# Patient Record
Sex: FEMALE | Race: WHITE | NOT HISPANIC OR LATINO | Employment: OTHER | URBAN - METROPOLITAN AREA
[De-identification: names, ages, dates, MRNs, and addresses within clinical notes are randomized per-mention and may not be internally consistent; named-entity substitution may affect disease eponyms.]

---

## 2020-11-09 ENCOUNTER — OFFICE VISIT (OUTPATIENT)
Dept: URGENT CARE | Facility: CLINIC | Age: 75
End: 2020-11-09
Payer: COMMERCIAL

## 2023-06-22 ENCOUNTER — OFFICE VISIT (OUTPATIENT)
Dept: PODIATRY | Facility: CLINIC | Age: 78
End: 2023-06-22
Payer: MEDICARE

## 2023-06-22 VITALS — WEIGHT: 120 LBS | HEIGHT: 67 IN | BODY MASS INDEX: 18.83 KG/M2

## 2023-06-22 DIAGNOSIS — M76.70 PERONEAL TENDONITIS, UNSPECIFIED LATERALITY: ICD-10-CM

## 2023-06-22 DIAGNOSIS — M19.079 OSTEOARTHRITIS OF ANKLE AND FOOT, UNSPECIFIED LATERALITY: ICD-10-CM

## 2023-06-22 DIAGNOSIS — M76.61 ACHILLES TENDINITIS OF RIGHT LOWER EXTREMITY: ICD-10-CM

## 2023-06-22 DIAGNOSIS — L97.511 ULCER OF RIGHT FOOT, LIMITED TO BREAKDOWN OF SKIN: Primary | ICD-10-CM

## 2023-06-22 PROCEDURE — 99203 OFFICE O/P NEW LOW 30 MIN: CPT | Mod: S$PBB,,, | Performed by: PODIATRIST

## 2023-06-22 PROCEDURE — 99999 PR PBB SHADOW E&M-EST. PATIENT-LVL II: CPT | Mod: PBBFAC,,, | Performed by: PODIATRIST

## 2023-06-22 PROCEDURE — 99999 PR PBB SHADOW E&M-EST. PATIENT-LVL II: ICD-10-PCS | Mod: PBBFAC,,, | Performed by: PODIATRIST

## 2023-06-22 PROCEDURE — 99212 OFFICE O/P EST SF 10 MIN: CPT | Mod: PBBFAC,PN | Performed by: PODIATRIST

## 2023-06-22 PROCEDURE — 99203 PR OFFICE/OUTPT VISIT, NEW, LEVL III, 30-44 MIN: ICD-10-PCS | Mod: S$PBB,,, | Performed by: PODIATRIST

## 2023-06-22 RX ORDER — CETIRIZINE HYDROCHLORIDE, PSEUDOEPHEDRINE HYDROCHLORIDE 5; 120 MG/1; MG/1
TABLET, FILM COATED, EXTENDED RELEASE ORAL
COMMUNITY
Start: 2022-10-28

## 2023-06-22 RX ORDER — ESTRADIOL 1 MG/1
1 TABLET ORAL
COMMUNITY
Start: 2023-03-03

## 2023-06-22 RX ORDER — MONTELUKAST SODIUM 10 MG/1
10 TABLET ORAL
COMMUNITY
Start: 2023-03-03

## 2023-06-22 RX ORDER — DICLOFENAC SODIUM 10 MG/G
2 GEL TOPICAL 4 TIMES DAILY
Qty: 200 G | Refills: 3 | Status: SHIPPED | OUTPATIENT
Start: 2023-06-22 | End: 2023-07-22

## 2023-06-25 NOTE — PROGRESS NOTES
"Subjective:       Patient ID: Laxmi Schwarz is a 77 y.o. female.    Chief Complaint: Ankle Pain and Callouses  Patient presents today with a complaint of a painful callused area underneath the big toe joint on the right foot she is also having heel and ankle pain.  Patient states she typically walks 3 miles per day but it has been difficult to do this because of the pain underneath the big toe joint and the swelling around the ankle.    History reviewed. No pertinent past medical history.  History reviewed. No pertinent surgical history.  History reviewed. No pertinent family history.  Social History     Socioeconomic History    Marital status:    Tobacco Use    Passive exposure: Never    Smokeless tobacco: Never       Current Outpatient Medications   Medication Sig Dispense Refill    cetirizine-pseudoephedrine (ZYRTEC-D) 5-120 mg Tb12   1 tab, Oral, BID, PRN as needed for congestion, # 20 tab, 0 Refill(s), Pharmacy: [facility]/pharmacy #[id number], 166, cm, 10/28/22 8:35:00 CDT, Height/Length Measured, 57.2, kg, 10/28/22 8:11:00 CDT, Weight Dosing      diclofenac sodium (VOLTAREN) 1 % Gel Apply 2 g topically 4 (four) times daily. 200 g 3    estradioL (ESTRACE) 1 MG tablet Take 1 mg by mouth.      montelukast (SINGULAIR) 10 mg tablet Take 10 mg by mouth.       No current facility-administered medications for this visit.     Review of patient's allergies indicates:   Allergen Reactions    Clindamycin     Erythromycin     Tetanus toxoid     Alendronate Other (See Comments)     bone pain       Review of Systems   Musculoskeletal:  Positive for arthralgias, gait problem and joint swelling.   All other systems reviewed and are negative.    Objective:      Vitals:    06/22/23 1325   Weight: 54.4 kg (120 lb)   Height: 5' 7" (1.702 m)     Physical Exam  Vitals and nursing note reviewed.   Constitutional:       Appearance: Normal appearance.   Cardiovascular:      Pulses:           Dorsalis pedis pulses are 1+ on " the right side and 1+ on the left side.        Posterior tibial pulses are 1+ on the right side and 1+ on the left side.   Pulmonary:      Effort: Pulmonary effort is normal.   Musculoskeletal:         General: Tenderness and deformity present.      Right foot: Deformity and prominent metatarsal heads present.      Left foot: Deformity and prominent metatarsal heads present.        Feet:    Feet:      Right foot:      Protective Sensation: 2 sites tested.  2 sites sensed.      Skin integrity: Ulcer, callus and dry skin present.      Left foot:      Protective Sensation: 2 sites tested.  2 sites sensed.   Skin:     Findings: Lesion present.   Neurological:      General: No focal deficit present.      Mental Status: She is alert.   Psychiatric:         Mood and Affect: Mood normal.         Behavior: Behavior normal.              Assessment:       1. Ulcer of right foot, limited to breakdown of skin    2. Osteoarthritis of ankle and foot, unspecified laterality    3. Peroneal tendonitis, unspecified laterality    4. Achilles tendinitis of right lower extremity        Plan:       Patient presents today with a complaint of a painful callused area underneath the big toe joint on the right foot she is also having heel and ankle pain.  Patient states she typically walks 3 miles per day but it has been difficult to do this because of the pain underneath the big toe joint and the swelling around the ankle.  A comprehensive new patient evaluation was performed it would appear because of the pain the patient was having underlying the sesamoid region and where the patient has a pre ulcerative callus formation sub 1st MPJ that she is been compensating walking on the outside of her right foot which is now cause some Achilles tendon pain it is also cause some peroneal tendinitis.  I do believe the tendinitis and Achilles tendon pain is a direct result of compensation for the aloe area of pre ulcerative callus.  I was able to non  excisionally debride the callus I advised the patient this has nothing to do with the wart I dispensed the patient some horseshoe pads recommended a 40% urea be applied to this area cover with a large Band-Aid this needs to be done every day I gave the patient the information to buy the 40% urea as well as the horseshoe pads and gave her some horseshoe pads additionally.  I explained to the patient she has very high arches because of her high arches her metatarsals are very prominent she is getting a lot of weight across this area and when she wears a heel it is putting even more weight across this area I do think that once the patient starts walking normally she is pain-free underlying the 1st MPJ the peroneal tendinitis will go away as will the Achilles tendinitis.  I did recommend the patient purchased some Oofos to wear in the house because these will be very supportive soft cushioned and will likely be very comfortable.  Patient had previously been worked very wearing very flat sandals in the house which are definitely not good plan follow-up will be as needed but I advised the patient she should be applying this 40% urea and utilizing these pads for at least several weeks to try to get this lesion to resolve completely.This note was created using [employer] voice recognition software that occasionally misinterpreted phrases or words.